# Patient Record
Sex: MALE | URBAN - METROPOLITAN AREA
[De-identification: names, ages, dates, MRNs, and addresses within clinical notes are randomized per-mention and may not be internally consistent; named-entity substitution may affect disease eponyms.]

---

## 2022-05-02 ENCOUNTER — OFFICE VISIT (OUTPATIENT)
Dept: URBAN - METROPOLITAN AREA CLINIC 41 | Facility: CLINIC | Age: 67
End: 2022-05-02
Payer: COMMERCIAL

## 2022-05-02 DIAGNOSIS — H25.13 AGE-RELATED NUCLEAR CATARACT, BILATERAL: ICD-10-CM

## 2022-05-02 DIAGNOSIS — H35.3211 EXDTVE AGE-REL MCLR DEGN, RIGHT EYE, WITH ACTV CHRDL NEOVAS: Primary | ICD-10-CM

## 2022-05-02 DIAGNOSIS — H35.3122 NEXDTVE AGE-RELATED MCLR DEGN, LEFT EYE, INTERMED DRY STAGE: ICD-10-CM

## 2022-05-02 PROCEDURE — 92134 CPTRZ OPH DX IMG PST SGM RTA: CPT | Performed by: OPHTHALMOLOGY

## 2022-05-02 PROCEDURE — 99204 OFFICE O/P NEW MOD 45 MIN: CPT | Performed by: OPHTHALMOLOGY

## 2022-05-02 ASSESSMENT — INTRAOCULAR PRESSURE
OS: 15
OD: 15

## 2022-05-02 NOTE — IMPRESSION/PLAN
Impression: Nexdtve age-related mclr degn, left eye, intermed dry stage: H35.3122. Plan: Exam and OCT demonstrate stable drusen and RPE changes. The patient was advised to continue AREDS 2 vitamin supplements; the risk of smoking was emphasized with the patient. The patient was also advised to continue to use an [address] to monitor the vision and to call immediately with any changes.

## 2022-05-20 ENCOUNTER — OFFICE VISIT (OUTPATIENT)
Dept: URBAN - METROPOLITAN AREA CLINIC 41 | Facility: CLINIC | Age: 67
End: 2022-05-20
Payer: COMMERCIAL

## 2022-05-20 PROCEDURE — 92134 CPTRZ OPH DX IMG PST SGM RTA: CPT | Performed by: OPHTHALMOLOGY

## 2022-05-20 PROCEDURE — 67028 INJECTION EYE DRUG: CPT | Performed by: OPHTHALMOLOGY

## 2022-05-20 PROCEDURE — 92012 INTRM OPH EXAM EST PATIENT: CPT | Performed by: OPHTHALMOLOGY

## 2022-05-20 ASSESSMENT — INTRAOCULAR PRESSURE
OD: 20
OS: 20

## 2022-05-20 NOTE — IMPRESSION/PLAN
Impression: Exdtve age-rel mclr degn, right eye, with actv chrdl neovas: H35.6560. Plan: Exam and OCT demonstrate drusen and RPE changes and SRF. 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  The diagnosis, natural history, and prognosis of wet AMD were discussed. The R/B/As of various treatment options including observation, laser (PDT), and intravitreal Anti-VEGF injections were discussed. Participation in a clinical trial was also discussed. The patient understands that treatment may not improve vision, but should reduce the risk of further visual loss. The patient also understands the likely need for chronic treatment and the risk of vision loss without treatment. Recommend intravitreal Avastin injection today. R/B/A discussed and patient elects to proceed. Intravitreal Avastin injected OD today without complication RTC 4-5 wk with OCT OU, Optos FA OD>OS, re-eval Avastin OD

## 2022-05-20 NOTE — IMPRESSION/PLAN
Impression: Nexdtve age-related mclr degn, left eye, intermed dry stage: H35.3122. Plan: Exam and OCT demonstrate stable drusen and RPE changes. The patient was advised to continue AREDS 2 vitamin supplements; the risk of smoking was emphasized with the patient. The patient was also advised to continue to use an [address] to monitor the vision and to call immediately with any changes.

## 2022-06-17 ENCOUNTER — OFFICE VISIT (OUTPATIENT)
Dept: URBAN - METROPOLITAN AREA CLINIC 41 | Facility: CLINIC | Age: 67
End: 2022-06-17
Payer: COMMERCIAL

## 2022-06-17 DIAGNOSIS — H35.3122 NEXDTVE AGE-RELATED MCLR DEGN, LEFT EYE, INTERMED DRY STAGE: ICD-10-CM

## 2022-06-17 DIAGNOSIS — H25.13 AGE-RELATED NUCLEAR CATARACT, BILATERAL: ICD-10-CM

## 2022-06-17 DIAGNOSIS — H35.3211 EXDTVE AGE-REL MCLR DEGN, RIGHT EYE, WITH ACTV CHRDL NEOVAS: Primary | ICD-10-CM

## 2022-06-17 PROCEDURE — 92134 CPTRZ OPH DX IMG PST SGM RTA: CPT | Performed by: OPHTHALMOLOGY

## 2022-06-17 PROCEDURE — 67028 INJECTION EYE DRUG: CPT | Performed by: OPHTHALMOLOGY

## 2022-06-17 PROCEDURE — 92012 INTRM OPH EXAM EST PATIENT: CPT | Performed by: OPHTHALMOLOGY

## 2022-06-17 ASSESSMENT — INTRAOCULAR PRESSURE
OD: 18
OS: 14

## 2022-06-17 NOTE — IMPRESSION/PLAN
Impression: Nexdtve age-related mclr degn, left eye, intermed dry stage: H35.3122. Plan: Exam and OCT demonstrate stable drusen and RPE changes. The patient was advised to continue AREDS 2 vitamin supplements; the risk of smoking was emphasized with the patient. The patient was also advised to continue to use an [address] to monitor the vision and to call immediately with any changes.

## 2022-06-17 NOTE — IMPRESSION/PLAN
Impression: Exdtve age-rel mclr degn, right eye, with actv chrdl neovas: H35.3211.
-s/p Avastin last 05/20/2022 Plan: Exam and OCT demonstrate drusen and RPE changes and SRF and SHRM. There has note been a significant change in vision or OCT appearance with starting the antiVEGF. The findings are consistent with wet AMD vs vitelliform. Recommend intravitreal Avastin injection today and extend next visit to 3 mo. R/B/A discussed and patient elects to proceed. Intravitreal Avastin injected OD today without complication RTC 3 mo with OCT OU, Optos FA OD>OS, re-eval Avastin OD vs observation OD

## 2022-09-07 ENCOUNTER — OFFICE VISIT (OUTPATIENT)
Dept: URBAN - METROPOLITAN AREA CLINIC 41 | Facility: CLINIC | Age: 67
End: 2022-09-07
Payer: COMMERCIAL

## 2022-09-07 DIAGNOSIS — H35.3211 EXDTVE AGE-REL MCLR DEGN, RIGHT EYE, WITH ACTV CHRDL NEOVAS: Primary | ICD-10-CM

## 2022-09-07 DIAGNOSIS — H25.13 AGE-RELATED NUCLEAR CATARACT, BILATERAL: ICD-10-CM

## 2022-09-07 DIAGNOSIS — H35.3122 NEXDTVE AGE-RELATED MCLR DEGN, LEFT EYE, INTERMED DRY STAGE: ICD-10-CM

## 2022-09-07 PROCEDURE — 92134 CPTRZ OPH DX IMG PST SGM RTA: CPT | Performed by: OPHTHALMOLOGY

## 2022-09-07 PROCEDURE — 92235 FLUORESCEIN ANGRPH MLTIFRAME: CPT | Performed by: OPHTHALMOLOGY

## 2022-09-07 PROCEDURE — 92014 COMPRE OPH EXAM EST PT 1/>: CPT | Performed by: OPHTHALMOLOGY

## 2022-09-07 ASSESSMENT — INTRAOCULAR PRESSURE
OD: 13
OS: 15

## 2022-09-07 NOTE — IMPRESSION/PLAN
Impression: Exdtve age-rel mclr degn, right eye, with actv chrdl neovas: H35.3211.
-s/p Avastin last 06/17/2022 Plan: Exam and OCT demonstrate drusen and RPE changes and SRF and SHRM. There has not been a significant change in vision or OCT appearance with starting the antiVEGF. Optos FA 9/7/2022 demonstrates staining without a significant amount of leakage. The findings are consistent with wet AMD vs vitelliform. Recommend close observation off antiVEGF. R/B/A discussed and patient elects to proceed. Observation today 3 mo since last Avastin RTC 3 mo with OCT OU, re-eval Avastin OD vs observation OD

## 2022-09-07 NOTE — IMPRESSION/PLAN
Impression: Nexdtve age-related mclr degn, left eye, intermed dry stage: H35.3122. Plan: Exam and OCT demonstrate stable drusen and RPE changes. The patient was advised to continue AREDS 2 vitamin supplements; the risk of smoking was emphasized with the patient. The patient was also advised to continue to use an [address] to monitor the vision and to call immediately with any changes.

## 2022-09-13 NOTE — IMPRESSION/PLAN
Impression: Exdtve age-rel mclr degn, right eye, with actv chrdl neovas: H35.7009. Plan: Exam and OCT demonstrate drusen and RPE changes and SRF. 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  The diagnosis, natural history, and prognosis of wet AMD were discussed. The R/B/As of various treatment options including observation, laser (PDT), and intravitreal Anti-VEGF injections were discussed. Participation in a clinical trial was also discussed. The patient understands that treatment may not improve vision, but should reduce the risk of further visual loss. The patient also understands the likely need for chronic treatment and the risk of vision loss without treatment. Recommend intravitreal Avastin injection today. R/B/A discussed and patient elects to proceed. Will schedule due to insurance authorization. 

RTC 1-3 wk with OCT OU, Optos FA OD>OS, re-eval Avastin OD verbal cues

## 2022-11-30 ENCOUNTER — OFFICE VISIT (OUTPATIENT)
Dept: URBAN - METROPOLITAN AREA CLINIC 41 | Facility: CLINIC | Age: 67
End: 2022-11-30
Payer: COMMERCIAL

## 2022-11-30 DIAGNOSIS — H25.13 AGE-RELATED NUCLEAR CATARACT, BILATERAL: ICD-10-CM

## 2022-11-30 DIAGNOSIS — H35.3211 EXDTVE AGE-REL MCLR DEGN, RIGHT EYE, WITH ACTV CHRDL NEOVAS: Primary | ICD-10-CM

## 2022-11-30 DIAGNOSIS — H35.3122 NEXDTVE AGE-RELATED MCLR DEGN, LEFT EYE, INTERMED DRY STAGE: ICD-10-CM

## 2022-11-30 PROCEDURE — 99214 OFFICE O/P EST MOD 30 MIN: CPT | Performed by: OPHTHALMOLOGY

## 2022-11-30 PROCEDURE — 92134 CPTRZ OPH DX IMG PST SGM RTA: CPT | Performed by: OPHTHALMOLOGY

## 2022-11-30 ASSESSMENT — INTRAOCULAR PRESSURE
OS: 17
OD: 17

## 2022-11-30 NOTE — IMPRESSION/PLAN
Impression: Nexdtve age-related mclr degn, left eye, intermed dry stage: H35.3122. Plan: Exam and OCT demonstrate stable drusen and RPE changes. The patient was advised to continue AREDS 2 vitamin supplements; the risk of smoking was emphasized with the patient. The patient was also advised to continue to use an [address] to monitor the vision and to call immediately with any changes.

## 2022-11-30 NOTE — IMPRESSION/PLAN
Impression: Exdtve age-rel mclr degn, right eye, with actv chrdl neovas: H35.3211.
-s/p Avastin last 06/17/2022 Plan: Exam and OCT demonstrate drusen and RPE changes and SRF and SHRM. There has not been a significant change in vision or OCT appearance with starting the antiVEGF; on testing today, there has not been a significant decline off of tx. Optos FA 9/7/2022 demonstrates staining without a significant amount of leakage. The findings are consistent with wet AMD vs vitelliform. Recommend continued close observation off antiVEGF. R/B/A discussed and patient elects to proceed. Observation today 5 mo since last Avastin RTC 3-4 mo with OCT OU, re-eval Avastin OD vs observation OD

## 2023-03-22 ENCOUNTER — OFFICE VISIT (OUTPATIENT)
Dept: URBAN - METROPOLITAN AREA CLINIC 41 | Facility: CLINIC | Age: 68
End: 2023-03-22
Payer: COMMERCIAL

## 2023-03-22 DIAGNOSIS — H35.3122 NEXDTVE AGE-RELATED MCLR DEGN, LEFT EYE, INTERMED DRY STAGE: ICD-10-CM

## 2023-03-22 DIAGNOSIS — H35.3211 EXDTVE AGE-REL MCLR DEGN, RIGHT EYE, WITH ACTV CHRDL NEOVAS: Primary | ICD-10-CM

## 2023-03-22 DIAGNOSIS — H25.13 AGE-RELATED NUCLEAR CATARACT, BILATERAL: ICD-10-CM

## 2023-03-22 PROCEDURE — 92134 CPTRZ OPH DX IMG PST SGM RTA: CPT | Performed by: OPHTHALMOLOGY

## 2023-03-22 PROCEDURE — 99214 OFFICE O/P EST MOD 30 MIN: CPT | Performed by: OPHTHALMOLOGY

## 2023-03-22 ASSESSMENT — INTRAOCULAR PRESSURE
OS: 14
OD: 17

## 2023-03-22 NOTE — IMPRESSION/PLAN
Impression: Nexdtve age-related mclr degn, left eye, intermed dry stage: H35.3122. Plan: Exam and OCT demonstrate stable drusen and RPE changes. The patient was advised to continue AREDS 2 vitamin supplements; the risk of smoking was emphasized with the patient. The patient was also advised to continue to use an [address] to monitor the vision and to call immediately with any changes.

## 2023-03-22 NOTE — IMPRESSION/PLAN
Impression: Exdtve age-rel mclr degn, right eye, with actv chrdl neovas: H35.3211.
-s/p Avastin last 06/17/2022 Plan: Exam and OCT demonstrate drusen and RPE changes and SRF and SHRM. There has not been a significant change in vision or OCT appearance with starting the antiVEGF; on testing today, there has not been a significant decline off of tx. Optos FA 9/7/2022 demonstrated staining without a significant amount of leakage. The findings are consistent with wet AMD vs vitelliform. Recommend continued close observation off antiVEGF. R/B/A discussed and patient elects to proceed. Observation today 9 mo since last Avastin RTC 12 mo with OCT OU, re-eval Avastin OD vs observation OD

## 2024-03-20 ENCOUNTER — OFFICE VISIT (OUTPATIENT)
Dept: URBAN - METROPOLITAN AREA CLINIC 41 | Facility: CLINIC | Age: 69
End: 2024-03-20
Payer: COMMERCIAL

## 2024-03-20 DIAGNOSIS — H25.13 AGE-RELATED NUCLEAR CATARACT, BILATERAL: ICD-10-CM

## 2024-03-20 DIAGNOSIS — H35.3122 NEXDTVE AGE-RELATED MCLR DEGN, LEFT EYE, INTERMED DRY STAGE: ICD-10-CM

## 2024-03-20 DIAGNOSIS — H35.3211 EXUDATIVE AGE-RELATED MACULAR DEGENERATION, RIGHT EYE, WITH ACTIVE CHOROIDAL NEOVASCULARIZATION: Primary | ICD-10-CM

## 2024-03-20 PROCEDURE — 99214 OFFICE O/P EST MOD 30 MIN: CPT | Performed by: OPHTHALMOLOGY

## 2024-03-20 PROCEDURE — 92134 CPTRZ OPH DX IMG PST SGM RTA: CPT | Performed by: OPHTHALMOLOGY

## 2024-03-20 ASSESSMENT — INTRAOCULAR PRESSURE
OS: 14
OD: 15